# Patient Record
Sex: MALE | Race: WHITE | NOT HISPANIC OR LATINO | URBAN - METROPOLITAN AREA
[De-identification: names, ages, dates, MRNs, and addresses within clinical notes are randomized per-mention and may not be internally consistent; named-entity substitution may affect disease eponyms.]

---

## 2022-03-23 ENCOUNTER — OFFICE (OUTPATIENT)
Dept: URBAN - METROPOLITAN AREA CLINIC 10 | Facility: CLINIC | Age: 67
End: 2022-03-23
Payer: COMMERCIAL

## 2022-03-23 VITALS
HEART RATE: 61 BPM | WEIGHT: 315 LBS | HEIGHT: 69 IN | SYSTOLIC BLOOD PRESSURE: 124 MMHG | DIASTOLIC BLOOD PRESSURE: 61 MMHG | OXYGEN SATURATION: 94 %

## 2022-03-23 DIAGNOSIS — Z12.11 ENCOUNTER FOR SCREENING FOR MALIGNANT NEOPLASM OF COLON: ICD-10-CM

## 2022-03-23 PROCEDURE — S0285 CNSLT BEFORE SCREEN COLONOSC: HCPCS | Performed by: INTERNAL MEDICINE

## 2022-03-23 NOTE — SERVICEHPINOTES
Patient is here to schedule screening colonoscopy.  Pt  is morbidly obese. BMI: 56.87. No H/O blood in stool.  No recent change  in bowel habits. Pt has sleep apnea and uses CPAP at 17 cm water opening pressure.  Negative family H/O colon CA.   Pt has some loose BMs.  3-4 times day.   The patient noted the onset of heartburn   10  years   ago.   Symptoms initially occured   1   times   per   day  and awakened the patient from sleep  .   Initially the patient took   prescription PPI drugs, Omeprazole  with   good   relief  .    Heartburn now occurs    times   per   .   The patient now takes   prescription PPI drugs BID   with   good   relief  .   Symptoms are worsened by   .   Associated symptoms are    .   Associated findings include   .  HTN is controlled with medications. Pt drives  a tugboat, Pt has diabetes. Last HgbA1c was 7.1

## 2022-03-23 NOTE — SERVICENOTES
Take blood pressure medication in the morning of  colonosocpy with a sip of water. Hold  diabetes meds

## 2022-03-25 ENCOUNTER — INPATIENT HOSPITAL (OUTPATIENT)
Dept: URBAN - METROPOLITAN AREA HOSPITAL 93 | Facility: HOSPITAL | Age: 67
End: 2022-03-25
Payer: COMMERCIAL

## 2022-03-25 DIAGNOSIS — Z12.11 ENCOUNTER FOR SCREENING FOR MALIGNANT NEOPLASM OF COLON: ICD-10-CM

## 2022-03-25 PROCEDURE — 45378 DIAGNOSTIC COLONOSCOPY: CPT | Performed by: INTERNAL MEDICINE
